# Patient Record
Sex: FEMALE | Race: WHITE | URBAN - METROPOLITAN AREA
[De-identification: names, ages, dates, MRNs, and addresses within clinical notes are randomized per-mention and may not be internally consistent; named-entity substitution may affect disease eponyms.]

---

## 2019-03-15 NOTE — PATIENT DISCUSSION
LUCENTIS AND EXTEND TO 9 WKS WITH  [facility] [place] - TRACE EDEMA AT 2 MONTHS.

## 2019-03-15 NOTE — PROCEDURE NOTE: CLINICAL

## 2020-02-28 NOTE — PROCEDURE NOTE: CLINICAL

## 2020-05-22 NOTE — PROCEDURE NOTE: CLINICAL
PROCEDURE NOTE: Lucentis 0.5mg PFS OS. Diagnosis: Neovascular AMD with Active CNV. Prior to injection, risks/benefits/alternatives discussed including but not limited to infection, loss of vision or eye, hemorrhage, cataract, glaucoma, retinal tears or detachment. The patient wished to proceed with treatment. Topical anesthesia was induced with Alcaine. Additional anesthesia was achieved using drop(s) or injection checked above. A drop of Povidone-iodine 5% ophthalmic solution was instilled over the injection site and in the inferior fornix. Betadine prep was performed. A single use prefilled syringe of intravitreal Lucentis 0.5mg/0.05ml was used and excess was disposed of as waste. The needle was passed 3.0 mm posterior to the limbus in pseudophakic patients, and 3.5 mm posterior to the limbus in phakic patients. Injection Time 550. Patient tolerated the procedure well. There were no complications. The eye was irrigated with sterile irrigating solution. Post procedure instructions given. The patient was instructed to use Artificial Tears q.i.d. p.r.n for comfort. The patient was instructed to return for re-evaluation in approximately 4-12 weeks depending on his/her condition and was told to call immediately if vision decreases and/or if his/her eye becomes red, painful, and/or light sensitive. The patient was instructed to go to the emergency room or call 911 if unable to reach the doctor within an hour or two of trying or calling. Jose Gannon

## 2020-07-16 ENCOUNTER — IMPORTED ENCOUNTER (OUTPATIENT)
Dept: URBAN - METROPOLITAN AREA CLINIC 50 | Facility: CLINIC | Age: 75
End: 2020-07-16

## 2020-07-20 ENCOUNTER — IMPORTED ENCOUNTER (OUTPATIENT)
Dept: URBAN - METROPOLITAN AREA CLINIC 50 | Facility: CLINIC | Age: 75
End: 2020-07-20

## 2020-07-20 NOTE — PATIENT DISCUSSION
"""Dermatochalasis RUL bothersome to patient. Recommend ptosis visual field and photos at patient's convenience for possible blepharoplasty. Patient will be contacted with results.  """

## 2020-07-20 NOTE — PATIENT DISCUSSION
"""Dermatochalasis YVETTE bothersome to patient. Recommend ptosis visual field and photos at patient's convenience for possible blepharoplasty. Patient will be contacted with results.  """

## 2020-08-21 NOTE — PROCEDURE NOTE: CLINICAL
PROCEDURE NOTE: Lucentis 0.5mg PFS OS. Diagnosis: Neovascular AMD with Active CNV. Anesthesia: Lidocaine 4%. Prep: Betadine Flush. Prior to injection, risks/benefits/alternatives discussed including but not limited to infection, loss of vision or eye, hemorrhage, cataract, glaucoma, retinal tears or detachment. The patient wished to proceed with treatment. Topical anesthesia was induced with Alcaine. Additional anesthesia was achieved using drop(s) or injection checked above. A drop of Povidone-iodine 5% ophthalmic solution was instilled over the injection site and in the inferior fornix. Betadine prep was performed. A single use prefilled syringe of intravitreal Lucentis 0.5mg/0.05ml was used and excess was disposed of as waste. The needle was passed 3.0 mm posterior to the limbus in pseudophakic patients, and 3.5 mm posterior to the limbus in phakic patients. Injection Time 550. Patient tolerated the procedure well. There were no complications. The eye was irrigated with sterile irrigating solution. Post procedure instructions given. The patient was instructed to use Artificial Tears q.i.d. p.r.n for comfort. The patient was instructed to return for re-evaluation in approximately 4-12 weeks depending on his/her condition and was told to call immediately if vision decreases and/or if his/her eye becomes red, painful, and/or light sensitive. The patient was instructed to go to the emergency room or call 911 if unable to reach the doctor within an hour or two of trying or calling. Marques Ryan

## 2021-04-17 ASSESSMENT — VISUAL ACUITY
OS_OTHER: 20/25. 20/30.
OD_BAT: 20/40
OD_CC: J1@ 16 IN
OS_CC: 20/25-2
OS_BAT: 20/25
OD_OTHER: 20/40. 20/40.
OS_CC: J1@ 16 IN
OD_CC: 20/30

## 2021-04-17 ASSESSMENT — TONOMETRY
OS_IOP_MMHG: 17
OD_IOP_MMHG: 17

## 2021-07-09 ENCOUNTER — PREPPED CHART (OUTPATIENT)
Dept: URBAN - METROPOLITAN AREA CLINIC 49 | Facility: CLINIC | Age: 76
End: 2021-07-09

## 2021-07-12 ENCOUNTER — ANNUAL COMPREHENSIVE EXAM (OUTPATIENT)
Dept: URBAN - METROPOLITAN AREA CLINIC 49 | Facility: CLINIC | Age: 76
End: 2021-07-12

## 2021-07-12 DIAGNOSIS — H35.372: ICD-10-CM

## 2021-07-12 DIAGNOSIS — H52.4: ICD-10-CM

## 2021-07-12 DIAGNOSIS — H25.13: ICD-10-CM

## 2021-07-12 DIAGNOSIS — H04.121: ICD-10-CM

## 2021-07-12 PROCEDURE — 92014 COMPRE OPH EXAM EST PT 1/>: CPT

## 2021-07-12 PROCEDURE — 92015 DETERMINE REFRACTIVE STATE: CPT

## 2021-07-12 ASSESSMENT — VISUAL ACUITY
OD_CC: 20/30+2
OD_GLARE: 20/40
OD_GLARE: 20/80
OS_CC: 20/30-2
OS_GLARE: 20/80
OU_CC: J1+/-
OS_GLARE: 20/100

## 2021-07-12 ASSESSMENT — TONOMETRY
OD_IOP_MMHG: 13
OS_IOP_MMHG: 13

## 2021-12-17 NOTE — PROCEDURE NOTE: CLINICAL
PROCEDURE NOTE: Lucentis 0.5mg PFS OS. Diagnosis: Neovascular AMD with Active CNV. Anesthesia: Lidocaine. Prep: Betadine Flush. Prior to injection, risks/benefits/alternatives discussed including but not limited to infection, loss of vision or eye, hemorrhage, cataract, glaucoma, retinal tears or detachment. The patient wished to proceed with treatment. Topical anesthesia was induced with Alcaine. Additional anesthesia was achieved using drop(s) or injection checked above. A drop of Povidone-iodine 5% ophthalmic solution was instilled over the injection site and in the inferior fornix. Betadine prep was performed. A single use prefilled syringe of intravitreal Lucentis 0.5mg/0.05ml was used and excess was disposed of as waste. The needle was passed 3.0 mm posterior to the limbus in pseudophakic patients, and 3.5 mm posterior to the limbus in phakic patients. Injection Time 1:23. Patient tolerated the procedure well. There were no complications. The eye was irrigated with sterile irrigating solution. Post procedure instructions given. The patient was instructed to use Artificial Tears q.i.d. p.r.n for comfort. The patient was instructed to return for re-evaluation in approximately 4-12 weeks depending on his/her condition and was told to call immediately if vision decreases and/or if his/her eye becomes red, painful, and/or light sensitive. The patient was instructed to go to the emergency room or call 911 if unable to reach the doctor within an hour or two of trying or calling. David Damon

## 2022-08-08 ENCOUNTER — COMPREHENSIVE EXAM (OUTPATIENT)
Dept: URBAN - METROPOLITAN AREA CLINIC 49 | Facility: CLINIC | Age: 77
End: 2022-08-08

## 2022-08-08 DIAGNOSIS — H43.813: ICD-10-CM

## 2022-08-08 DIAGNOSIS — H25.13: ICD-10-CM

## 2022-08-08 DIAGNOSIS — H35.373: ICD-10-CM

## 2022-08-08 PROCEDURE — 92134 CPTRZ OPH DX IMG PST SGM RTA: CPT

## 2022-08-08 PROCEDURE — 92014 COMPRE OPH EXAM EST PT 1/>: CPT

## 2022-08-08 ASSESSMENT — TONOMETRY
OD_IOP_MMHG: 14
OS_IOP_MMHG: 16

## 2022-08-08 ASSESSMENT — VISUAL ACUITY
OS_GLARE: 20/25
OU_CC: J1+
OD_PH: 20/25
OS_CC: 20/25-1
OD_CC: 20/30+2
OD_GLARE: 20/25
OD_GLARE: 20/40
OS_GLARE: 20/30-1

## 2022-08-08 NOTE — PATIENT DISCUSSION
Patient states graves disease has been under control. Start tears 2-3 times a day or while reading, sample of refresh PF relieva given.

## 2023-08-14 ENCOUNTER — COMPREHENSIVE EXAM (OUTPATIENT)
Dept: URBAN - METROPOLITAN AREA CLINIC 49 | Facility: LOCATION | Age: 78
End: 2023-08-14

## 2023-08-14 DIAGNOSIS — H02.831: ICD-10-CM

## 2023-08-14 DIAGNOSIS — H43.813: ICD-10-CM

## 2023-08-14 DIAGNOSIS — H04.123: ICD-10-CM

## 2023-08-14 DIAGNOSIS — E05.00: ICD-10-CM

## 2023-08-14 DIAGNOSIS — H02.834: ICD-10-CM

## 2023-08-14 DIAGNOSIS — H25.13: ICD-10-CM

## 2023-08-14 PROCEDURE — 92134 CPTRZ OPH DX IMG PST SGM RTA: CPT

## 2023-08-14 PROCEDURE — 92014 COMPRE OPH EXAM EST PT 1/>: CPT

## 2023-08-14 ASSESSMENT — VISUAL ACUITY
OS_GLARE: 20/40
OD_GLARE: 20/40
OD_GLARE: 20/30
OD_CC: 20/30
OS_CC: 20/25-1
OU_CC: J1@16"
OS_GLARE: 20/25

## 2023-08-14 ASSESSMENT — KERATOMETRY
OD_AXISANGLE2_DEGREES: 76
OS_K1POWER_DIOPTERS: 45.75
OS_AXISANGLE2_DEGREES: 91
OD_K1POWER_DIOPTERS: 45.00
OS_K2POWER_DIOPTERS: 46.25
OS_AXISANGLE_DEGREES: 001
OD_K2POWER_DIOPTERS: 45.25
OD_AXISANGLE_DEGREES: 166

## 2023-08-14 ASSESSMENT — TONOMETRY
OD_IOP_MMHG: 15
OS_IOP_MMHG: 15